# Patient Record
Sex: FEMALE | Race: WHITE | NOT HISPANIC OR LATINO
[De-identification: names, ages, dates, MRNs, and addresses within clinical notes are randomized per-mention and may not be internally consistent; named-entity substitution may affect disease eponyms.]

---

## 2018-09-24 ENCOUNTER — RESULT REVIEW (OUTPATIENT)
Age: 58
End: 2018-09-24

## 2019-01-03 ENCOUNTER — OUTPATIENT (OUTPATIENT)
Dept: OUTPATIENT SERVICES | Facility: HOSPITAL | Age: 59
LOS: 1 days | End: 2019-01-03
Payer: COMMERCIAL

## 2019-01-03 VITALS
HEIGHT: 61 IN | RESPIRATION RATE: 14 BRPM | WEIGHT: 108.91 LBS | HEART RATE: 56 BPM | OXYGEN SATURATION: 99 % | SYSTOLIC BLOOD PRESSURE: 127 MMHG | TEMPERATURE: 98 F | DIASTOLIC BLOOD PRESSURE: 75 MMHG

## 2019-01-03 DIAGNOSIS — K80.20 CALCULUS OF GALLBLADDER WITHOUT CHOLECYSTITIS WITHOUT OBSTRUCTION: ICD-10-CM

## 2019-01-03 DIAGNOSIS — K08.409 PARTIAL LOSS OF TEETH, UNSPECIFIED CAUSE, UNSPECIFIED CLASS: Chronic | ICD-10-CM

## 2019-01-03 DIAGNOSIS — Z90.89 ACQUIRED ABSENCE OF OTHER ORGANS: Chronic | ICD-10-CM

## 2019-01-03 DIAGNOSIS — K80.10 CALCULUS OF GALLBLADDER WITH CHRONIC CHOLECYSTITIS WITHOUT OBSTRUCTION: ICD-10-CM

## 2019-01-03 DIAGNOSIS — Z98.891 HISTORY OF UTERINE SCAR FROM PREVIOUS SURGERY: Chronic | ICD-10-CM

## 2019-01-03 DIAGNOSIS — Z01.818 ENCOUNTER FOR OTHER PREPROCEDURAL EXAMINATION: ICD-10-CM

## 2019-01-03 LAB
ALBUMIN SERPL ELPH-MCNC: 3.8 G/DL — SIGNIFICANT CHANGE UP (ref 3.3–5)
ALP SERPL-CCNC: 51 U/L — SIGNIFICANT CHANGE UP (ref 30–120)
ALT FLD-CCNC: 40 U/L DA — SIGNIFICANT CHANGE UP (ref 10–60)
ANION GAP SERPL CALC-SCNC: 8 MMOL/L — SIGNIFICANT CHANGE UP (ref 5–17)
AST SERPL-CCNC: 30 U/L — SIGNIFICANT CHANGE UP (ref 10–40)
BILIRUB SERPL-MCNC: 0.5 MG/DL — SIGNIFICANT CHANGE UP (ref 0.2–1.2)
BUN SERPL-MCNC: 12 MG/DL — SIGNIFICANT CHANGE UP (ref 7–23)
CALCIUM SERPL-MCNC: 9 MG/DL — SIGNIFICANT CHANGE UP (ref 8.4–10.5)
CHLORIDE SERPL-SCNC: 105 MMOL/L — SIGNIFICANT CHANGE UP (ref 96–108)
CO2 SERPL-SCNC: 30 MMOL/L — SIGNIFICANT CHANGE UP (ref 22–31)
CREAT SERPL-MCNC: 0.88 MG/DL — SIGNIFICANT CHANGE UP (ref 0.5–1.3)
GLUCOSE SERPL-MCNC: 107 MG/DL — HIGH (ref 70–99)
HCT VFR BLD CALC: 39.6 % — SIGNIFICANT CHANGE UP (ref 34.5–45)
HGB BLD-MCNC: 13.8 G/DL — SIGNIFICANT CHANGE UP (ref 11.5–15.5)
MCHC RBC-ENTMCNC: 32.1 PG — SIGNIFICANT CHANGE UP (ref 27–34)
MCHC RBC-ENTMCNC: 34.8 GM/DL — SIGNIFICANT CHANGE UP (ref 32–36)
MCV RBC AUTO: 92.1 FL — SIGNIFICANT CHANGE UP (ref 80–100)
NRBC # BLD: 0 /100 WBCS — SIGNIFICANT CHANGE UP (ref 0–0)
PLATELET # BLD AUTO: 257 K/UL — SIGNIFICANT CHANGE UP (ref 150–400)
POTASSIUM SERPL-MCNC: 3.6 MMOL/L — SIGNIFICANT CHANGE UP (ref 3.5–5.3)
POTASSIUM SERPL-SCNC: 3.6 MMOL/L — SIGNIFICANT CHANGE UP (ref 3.5–5.3)
PROT SERPL-MCNC: 6.8 G/DL — SIGNIFICANT CHANGE UP (ref 6–8.3)
RBC # BLD: 4.3 M/UL — SIGNIFICANT CHANGE UP (ref 3.8–5.2)
RBC # FLD: 13.2 % — SIGNIFICANT CHANGE UP (ref 10.3–14.5)
SODIUM SERPL-SCNC: 143 MMOL/L — SIGNIFICANT CHANGE UP (ref 135–145)
WBC # BLD: 5.73 K/UL — SIGNIFICANT CHANGE UP (ref 3.8–10.5)
WBC # FLD AUTO: 5.73 K/UL — SIGNIFICANT CHANGE UP (ref 3.8–10.5)

## 2019-01-03 PROCEDURE — 86900 BLOOD TYPING SEROLOGIC ABO: CPT

## 2019-01-03 PROCEDURE — G0463: CPT

## 2019-01-03 PROCEDURE — 36415 COLL VENOUS BLD VENIPUNCTURE: CPT

## 2019-01-03 PROCEDURE — 93005 ELECTROCARDIOGRAM TRACING: CPT

## 2019-01-03 PROCEDURE — 93010 ELECTROCARDIOGRAM REPORT: CPT

## 2019-01-03 PROCEDURE — 80053 COMPREHEN METABOLIC PANEL: CPT

## 2019-01-03 PROCEDURE — 86901 BLOOD TYPING SEROLOGIC RH(D): CPT

## 2019-01-03 PROCEDURE — 86850 RBC ANTIBODY SCREEN: CPT

## 2019-01-03 PROCEDURE — 85027 COMPLETE CBC AUTOMATED: CPT

## 2019-01-03 NOTE — H&P PST ADULT - FAMILY HISTORY
Father  Still living? No  Family history of diabetes mellitus, Age at diagnosis: Age Unknown  Family history of heart disease, Age at diagnosis: Age Unknown     Child  Still living? No  Family history of diabetes mellitus, Age at diagnosis: Age Unknown     Mother  Still living? Yes, Estimated age: 71-80  Family history of breast cancer in mother, Age at diagnosis: Age Unknown

## 2019-01-03 NOTE — H&P PST ADULT - NSANTHOSAYNRD_GEN_A_CORE
No. CHANELLE screening performed.  STOP BANG Legend: 0-2 = LOW Risk; 3-4 = INTERMEDIATE Risk; 5-8 = HIGH Risk

## 2019-01-03 NOTE — H&P PST ADULT - HISTORY OF PRESENT ILLNESS
this is a 59 y/o female who has had nausea, stomach pain, right back pain, chills on and off and 1 episode of left chest pain for about 1 month; tests show large gallstone; to have gallbladder removed

## 2019-01-04 RX ORDER — CHLORHEXIDINE GLUCONATE 213 G/1000ML
1 SOLUTION TOPICAL ONCE
Qty: 0 | Refills: 0 | Status: COMPLETED | OUTPATIENT
Start: 2019-01-10 | End: 2019-01-10

## 2019-01-04 NOTE — ASU DISCHARGE PLAN (ADULT/PEDIATRIC). - MEDICATION SUMMARY - MEDICATIONS TO TAKE
I will START or STAY ON the medications listed below when I get home from the hospital:    oxycodone-acetaminophen 5 mg-325 mg oral tablet  -- 1 tab(s) by mouth every 6 hours, As Needed -for moderate pain MDD:4   -- Caution federal law prohibits the transfer of this drug to any person other  than the person for whom it was prescribed.  May cause drowsiness.  Alcohol may intensify this effect.  Use care when operating dangerous machinery.  This prescription cannot be refilled.  This product contains acetaminophen.  Do not use  with any other product containing acetaminophen to prevent possible liver damage.  Using more of this medication than prescribed may cause serious breathing problems.    -- Indication: For pain

## 2019-01-09 ENCOUNTER — TRANSCRIPTION ENCOUNTER (OUTPATIENT)
Age: 59
End: 2019-01-09

## 2019-01-10 ENCOUNTER — OUTPATIENT (OUTPATIENT)
Dept: OUTPATIENT SERVICES | Facility: HOSPITAL | Age: 59
LOS: 1 days | End: 2019-01-10
Payer: COMMERCIAL

## 2019-01-10 ENCOUNTER — RESULT REVIEW (OUTPATIENT)
Age: 59
End: 2019-01-10

## 2019-01-10 VITALS
OXYGEN SATURATION: 99 % | SYSTOLIC BLOOD PRESSURE: 121 MMHG | DIASTOLIC BLOOD PRESSURE: 65 MMHG | HEART RATE: 71 BPM | TEMPERATURE: 98 F | RESPIRATION RATE: 18 BRPM

## 2019-01-10 VITALS
HEIGHT: 61 IN | WEIGHT: 108.25 LBS | OXYGEN SATURATION: 100 % | SYSTOLIC BLOOD PRESSURE: 114 MMHG | DIASTOLIC BLOOD PRESSURE: 64 MMHG | RESPIRATION RATE: 10 BRPM | HEART RATE: 52 BPM | TEMPERATURE: 98 F

## 2019-01-10 DIAGNOSIS — K80.10 CALCULUS OF GALLBLADDER WITH CHRONIC CHOLECYSTITIS WITHOUT OBSTRUCTION: ICD-10-CM

## 2019-01-10 DIAGNOSIS — Z98.891 HISTORY OF UTERINE SCAR FROM PREVIOUS SURGERY: Chronic | ICD-10-CM

## 2019-01-10 DIAGNOSIS — Z90.89 ACQUIRED ABSENCE OF OTHER ORGANS: Chronic | ICD-10-CM

## 2019-01-10 DIAGNOSIS — K08.409 PARTIAL LOSS OF TEETH, UNSPECIFIED CAUSE, UNSPECIFIED CLASS: Chronic | ICD-10-CM

## 2019-01-10 PROCEDURE — 47562 LAPAROSCOPIC CHOLECYSTECTOMY: CPT

## 2019-01-10 PROCEDURE — 49585: CPT | Mod: AS

## 2019-01-10 PROCEDURE — 47562 LAPAROSCOPIC CHOLECYSTECTOMY: CPT | Mod: AS

## 2019-01-10 PROCEDURE — 88304 TISSUE EXAM BY PATHOLOGIST: CPT | Mod: 26

## 2019-01-10 RX ORDER — ONDANSETRON 8 MG/1
4 TABLET, FILM COATED ORAL ONCE
Qty: 0 | Refills: 0 | Status: DISCONTINUED | OUTPATIENT
Start: 2019-01-10 | End: 2019-01-10

## 2019-01-10 RX ORDER — HYDROMORPHONE HYDROCHLORIDE 2 MG/ML
0.5 INJECTION INTRAMUSCULAR; INTRAVENOUS; SUBCUTANEOUS
Qty: 0 | Refills: 0 | Status: DISCONTINUED | OUTPATIENT
Start: 2019-01-10 | End: 2019-01-10

## 2019-01-10 RX ORDER — OXYCODONE HYDROCHLORIDE 5 MG/1
5 TABLET ORAL ONCE
Qty: 0 | Refills: 0 | Status: DISCONTINUED | OUTPATIENT
Start: 2019-01-10 | End: 2019-01-10

## 2019-01-10 RX ORDER — SODIUM CHLORIDE 9 MG/ML
1000 INJECTION, SOLUTION INTRAVENOUS
Qty: 0 | Refills: 0 | Status: DISCONTINUED | OUTPATIENT
Start: 2019-01-10 | End: 2019-01-10

## 2019-01-10 RX ADMIN — OXYCODONE HYDROCHLORIDE 5 MILLIGRAM(S): 5 TABLET ORAL at 10:25

## 2019-01-10 RX ADMIN — SODIUM CHLORIDE 100 MILLILITER(S): 9 INJECTION, SOLUTION INTRAVENOUS at 10:49

## 2019-01-10 RX ADMIN — CHLORHEXIDINE GLUCONATE 1 APPLICATION(S): 213 SOLUTION TOPICAL at 07:39

## 2019-01-10 NOTE — BRIEF OPERATIVE NOTE - PROCEDURE
<<-----Click on this checkbox to enter Procedure Cholecystectomy, laparoscopic  01/10/2019    Active  LWERMELING

## 2021-02-09 PROBLEM — Z00.00 ENCOUNTER FOR PREVENTIVE HEALTH EXAMINATION: Status: ACTIVE | Noted: 2021-02-09

## 2021-02-16 LAB — CYTOLOGY CVX/VAG DOC THIN PREP: NEGATIVE

## 2021-02-17 ENCOUNTER — APPOINTMENT (OUTPATIENT)
Dept: OBGYN | Facility: CLINIC | Age: 61
End: 2021-02-17
Payer: COMMERCIAL

## 2021-02-17 VITALS
WEIGHT: 116 LBS | SYSTOLIC BLOOD PRESSURE: 110 MMHG | BODY MASS INDEX: 21.9 KG/M2 | DIASTOLIC BLOOD PRESSURE: 70 MMHG | HEIGHT: 61 IN

## 2021-02-17 DIAGNOSIS — Z80.3 FAMILY HISTORY OF MALIGNANT NEOPLASM OF BREAST: ICD-10-CM

## 2021-02-17 DIAGNOSIS — D21.9 BENIGN NEOPLASM OF CONNECTIVE AND OTHER SOFT TISSUE, UNSPECIFIED: ICD-10-CM

## 2021-02-17 LAB
BILIRUB UR QL STRIP: NORMAL
CLARITY UR: CLEAR
COLLECTION METHOD: NORMAL
GLUCOSE UR-MCNC: NORMAL
HCG UR QL: 0.2 EU/DL
HGB UR QL STRIP.AUTO: NORMAL
KETONES UR-MCNC: NORMAL
LEUKOCYTE ESTERASE UR QL STRIP: NORMAL
NITRITE UR QL STRIP: NORMAL
PH UR STRIP: 7.5
PROT UR STRIP-MCNC: NORMAL
SP GR UR STRIP: 1.01

## 2021-02-17 PROCEDURE — 99396 PREV VISIT EST AGE 40-64: CPT | Mod: 25

## 2021-02-17 PROCEDURE — 81003 URINALYSIS AUTO W/O SCOPE: CPT | Mod: QW

## 2021-02-17 PROCEDURE — 99072 ADDL SUPL MATRL&STAF TM PHE: CPT

## 2021-02-17 RX ORDER — ZOLPIDEM TARTRATE 10 MG/1
10 TABLET, FILM COATED ORAL
Refills: 0 | Status: ACTIVE | COMMUNITY

## 2021-02-17 RX ORDER — ESTRADIOL 10 UG/1
10 TABLET, FILM COATED VAGINAL
Qty: 24 | Refills: 3 | Status: ACTIVE | COMMUNITY
Start: 2021-02-17 | End: 1900-01-01

## 2021-02-17 RX ORDER — ESTRADIOL 0.1 MG/G
0.1 CREAM VAGINAL
Qty: 1 | Refills: 2 | Status: ACTIVE | COMMUNITY
Start: 2021-02-17 | End: 1900-01-01

## 2021-02-18 LAB — HPV HIGH+LOW RISK DNA PNL CVX: NOT DETECTED

## 2021-02-21 LAB — CYTOLOGY CVX/VAG DOC THIN PREP: NORMAL

## 2021-03-31 ENCOUNTER — APPOINTMENT (OUTPATIENT)
Dept: OBGYN | Facility: CLINIC | Age: 61
End: 2021-03-31
Payer: COMMERCIAL

## 2021-03-31 ENCOUNTER — ASOB RESULT (OUTPATIENT)
Age: 61
End: 2021-03-31

## 2021-03-31 PROCEDURE — 77080 DXA BONE DENSITY AXIAL: CPT

## 2021-03-31 PROCEDURE — 76830 TRANSVAGINAL US NON-OB: CPT

## 2021-03-31 PROCEDURE — 99072 ADDL SUPL MATRL&STAF TM PHE: CPT

## 2022-04-27 ENCOUNTER — APPOINTMENT (OUTPATIENT)
Dept: OBGYN | Facility: CLINIC | Age: 62
End: 2022-04-27
Payer: COMMERCIAL

## 2022-04-27 VITALS
WEIGHT: 118 LBS | DIASTOLIC BLOOD PRESSURE: 72 MMHG | SYSTOLIC BLOOD PRESSURE: 105 MMHG | BODY MASS INDEX: 22.28 KG/M2 | HEIGHT: 61 IN

## 2022-04-27 DIAGNOSIS — Z01.419 ENCOUNTER FOR GYNECOLOGICAL EXAMINATION (GENERAL) (ROUTINE) W/OUT ABNORMAL FINDINGS: ICD-10-CM

## 2022-04-27 DIAGNOSIS — N95.1 MENOPAUSAL AND FEMALE CLIMACTERIC STATES: ICD-10-CM

## 2022-04-27 DIAGNOSIS — Z80.3 FAMILY HISTORY OF MALIGNANT NEOPLASM OF BREAST: ICD-10-CM

## 2022-04-27 DIAGNOSIS — Z78.9 OTHER SPECIFIED HEALTH STATUS: ICD-10-CM

## 2022-04-27 PROCEDURE — 82270 OCCULT BLOOD FECES: CPT

## 2022-04-27 PROCEDURE — 99396 PREV VISIT EST AGE 40-64: CPT

## 2022-04-28 LAB — HPV HIGH+LOW RISK DNA PNL CVX: NOT DETECTED

## 2022-05-02 LAB — CYTOLOGY CVX/VAG DOC THIN PREP: NORMAL

## 2022-05-20 ENCOUNTER — APPOINTMENT (OUTPATIENT)
Dept: OBGYN | Facility: CLINIC | Age: 62
End: 2022-05-20
Payer: COMMERCIAL

## 2022-05-20 ENCOUNTER — ASOB RESULT (OUTPATIENT)
Age: 62
End: 2022-05-20

## 2022-05-20 PROCEDURE — 76830 TRANSVAGINAL US NON-OB: CPT

## 2022-09-26 ENCOUNTER — RX RENEWAL (OUTPATIENT)
Age: 62
End: 2022-09-26

## 2022-09-26 RX ORDER — ESTRADIOL 10 UG/1
10 TABLET, FILM COATED VAGINAL
Qty: 24 | Refills: 3 | Status: ACTIVE | COMMUNITY
Start: 2022-04-27 | End: 1900-01-01

## 2022-10-09 ENCOUNTER — EMERGENCY (EMERGENCY)
Facility: HOSPITAL | Age: 62
LOS: 1 days | Discharge: HOME | End: 2022-10-09
Attending: EMERGENCY MEDICINE | Admitting: EMERGENCY MEDICINE
Payer: COMMERCIAL

## 2022-10-09 VITALS
DIASTOLIC BLOOD PRESSURE: 72 MMHG | OXYGEN SATURATION: 98 % | HEART RATE: 64 BPM | SYSTOLIC BLOOD PRESSURE: 113 MMHG | RESPIRATION RATE: 16 BRPM | TEMPERATURE: 98 F | WEIGHT: 113.98 LBS | HEIGHT: 65 IN

## 2022-10-09 VITALS
OXYGEN SATURATION: 98 % | RESPIRATION RATE: 16 BRPM | HEART RATE: 62 BPM | DIASTOLIC BLOOD PRESSURE: 70 MMHG | SYSTOLIC BLOOD PRESSURE: 115 MMHG

## 2022-10-09 DIAGNOSIS — K08.409 PARTIAL LOSS OF TEETH, UNSPECIFIED CAUSE, UNSPECIFIED CLASS: Chronic | ICD-10-CM

## 2022-10-09 DIAGNOSIS — Z98.891 HISTORY OF UTERINE SCAR FROM PREVIOUS SURGERY: Chronic | ICD-10-CM

## 2022-10-09 DIAGNOSIS — Z90.89 ACQUIRED ABSENCE OF OTHER ORGANS: Chronic | ICD-10-CM

## 2022-10-09 PROCEDURE — 72125 CT NECK SPINE W/O DYE: CPT | Mod: MA

## 2022-10-09 PROCEDURE — 70450 CT HEAD/BRAIN W/O DYE: CPT | Mod: MA

## 2022-10-09 PROCEDURE — 99284 EMERGENCY DEPT VISIT MOD MDM: CPT | Mod: 25

## 2022-10-09 PROCEDURE — 23650 CLTX SHO DSLC W/MNPJ WO ANES: CPT | Mod: 54

## 2022-10-09 PROCEDURE — 99285 EMERGENCY DEPT VISIT HI MDM: CPT | Mod: 57

## 2022-10-09 PROCEDURE — 23650 CLTX SHO DSLC W/MNPJ WO ANES: CPT | Mod: RT

## 2022-10-09 PROCEDURE — 72125 CT NECK SPINE W/O DYE: CPT | Mod: 26,MA

## 2022-10-09 PROCEDURE — 70450 CT HEAD/BRAIN W/O DYE: CPT | Mod: 26,MA

## 2022-10-09 PROCEDURE — 73030 X-RAY EXAM OF SHOULDER: CPT | Mod: 26,RT

## 2022-10-09 PROCEDURE — 73030 X-RAY EXAM OF SHOULDER: CPT

## 2022-10-09 RX ORDER — OXYCODONE AND ACETAMINOPHEN 5; 325 MG/1; MG/1
1 TABLET ORAL ONCE
Refills: 0 | Status: DISCONTINUED | OUTPATIENT
Start: 2022-10-09 | End: 2022-10-09

## 2022-10-09 RX ORDER — ONDANSETRON 8 MG/1
4 TABLET, FILM COATED ORAL ONCE
Refills: 0 | Status: COMPLETED | OUTPATIENT
Start: 2022-10-09 | End: 2022-10-09

## 2022-10-09 RX ADMIN — ONDANSETRON 4 MILLIGRAM(S): 8 TABLET, FILM COATED ORAL at 18:54

## 2022-10-09 RX ADMIN — OXYCODONE AND ACETAMINOPHEN 1 TABLET(S): 5; 325 TABLET ORAL at 20:16

## 2022-10-09 RX ADMIN — Medication 300 MILLIGRAM(S): at 20:16

## 2022-10-09 RX ADMIN — OXYCODONE AND ACETAMINOPHEN 1 TABLET(S): 5; 325 TABLET ORAL at 18:54

## 2022-10-09 NOTE — ED PROVIDER NOTE - NSFOLLOWUPINSTRUCTIONS_ED_ALL_ED_FT
1) Follow-up with Orthopedics, See referred doctor. Call today/next business day for close, prompt follow-up.  2) Return to Emergency room for any worsening or persistent pain, weakness, numbness, fever, color change to extremity, or any other concerning symptoms.  3) Take ibuprofen 600 mg every  6 hours as needed.   4) You can consider discussing with your doctor if physical therapy or further imaging as an MRI may be beneficial.     Antibiotics were sent to your pharmacy.         Shoulder Dislocation       Your shoulder joint is made up of 3 bones:  •The upper arm bone (humerus).      •The shoulder blade (scapula).      •The collarbone (clavicle).      A shoulder dislocation happens when your upper arm bone moves out of its normal place in your shoulder joint.      What are the causes?    This condition is often caused by:  •A fall.      •A hard, direct hit to the shoulder.      •A forceful movement of the shoulder.        What increases the risk?    You are more likely to develop this condition if you play sports.      What are the signs or symptoms?     •Bad shape (deformity) of the shoulder.      •Very bad pain.      •A shoulder that you cannot move.      •Numbness, weakness, or tingling in your neck or down your arm.      •Bruising or swelling around your shoulder.        How is this treated?    This condition is treated with a procedure called a reduction. This is done to place the upper arm bone back in the joint. There are two types of reduction:  •Closed reduction. The upper arm bone is placed back in the joint without surgery. The doctor uses his or her hands to guide the bone back into place.    •Open reduction. Surgery is done to place the upper arm bone back in the joint. This may be needed if:  •You have a weak shoulder joint or weak tissues that connect bones to each other (ligaments).      •You have had more than one shoulder dislocation.      •The nerves or blood vessels around your shoulder have been damaged.        After the procedure, you will wear a brace or sling to prevent the arm from moving.    After the brace or sling is removed, you will have physical therapy to help improve movement (range of motion) in your shoulder joint.      Follow these instructions at home:    Medicines     •Take over-the-counter and prescription medicines only as told by your doctor.    •Ask your doctor if the medicine prescribed to you:   •Requires you to avoid driving or using heavy machinery.     •Can cause trouble pooping (constipation). You may need to take steps to prevent or treat trouble pooping:  •Drink enough fluid to keep your pee (urine) pale yellow.      •Take over-the-counter or prescription medicines.      •Eat foods that are high in fiber. These include beans, whole grains, and fresh fruits and vegetables.       •Limit foods that are high in fat and sugar. These include fried or sweet foods.          If you have a brace or sling:     •Wear the brace or sling as told by your doctor. Remove it only as told by your doctor.    •Loosen the brace or sling if your fingers:  •Tingle.      •Become numb.      •Turn cold or blue.        •Keep the brace or sling clean.    •If the brace or sling is not waterproof:  •Do not let it get wet.      •Cover it with a watertight covering when you take a bath or shower.          Managing pain, stiffness, and swelling    •If told, put ice on the injured area.  •If you can remove your brace or sling, remove it as told by your doctor.      •Put ice in a plastic bag.      •Place a towel between your skin and the bag.      •Leave the ice on for 20 minutes, 2–3 times per day.        •Move your fingers often.      •Raise (elevate) the injured area above the level of your heart while you are sitting or lying down.      Activity     • Do not lift your arm above shoulder level until your doctor approves.      • Do not lift anything until your doctor says that it is safe.      • Do not push or pull things until your doctor approves.      •Return to your normal activities as told by your doctor. Ask your doctor what activities are safe for you.      •Do range-of-motion exercises only as told by your doctor.      •Exercise your hand by squeezing a soft ball. This keeps your hand and wrist from getting stiff and swollen.      General instructions     • Do not drive while you are wearing a brace or sling on a hand that you use for driving.      • Do not take baths, swim, or use a hot tub until your doctor approves. Ask your doctor if you may take showers. You may only be allowed to take sponge baths.      • Do not use any tobacco products, including cigarettes, chewing tobacco, or e-cigarettes. These can delay healing. If you need help quitting, ask your doctor.      •Keep all follow-up visits as told by your doctor. This is important.        Contact a doctor if:    •Your brace or sling gets damaged.        Get help right away if:    •Your pain gets worse, not better.      •You lose feeling in your arm or hand.      •Your arm or hand turns white and cold.        Summary    •A shoulder dislocation happens when your upper arm bone moves out of its normal place in your shoulder joint.      •It is often caused by a fall, a strong hit to the shoulder, or a forceful movement of the shoulder.      •It causes very bad pain. You may not be able to move your shoulder.      •This condition is treated with either closed or open reduction. You will also be given a brace or sling. You will do exercises to improve movement in your shoulder joint.      •Contact a doctor if your brace or sling gets damaged. Get help right away if your pain gets worse, you lose feeling in your arm or hand, or your arm or hand turns white or cold.      This information is not intended to replace advice given to you by your health care provider. Make sure you discuss any questions you have with your health care provider.

## 2022-10-09 NOTE — ED PROVIDER NOTE - NSICDXFAMILYHX_GEN_ALL_CORE_FT
FAMILY HISTORY:  Father  Still living? No  Family history of diabetes mellitus, Age at diagnosis: Age Unknown  Family history of heart disease, Age at diagnosis: Age Unknown    Mother  Still living? Yes, Estimated age: 71-80  Family history of breast cancer in mother, Age at diagnosis: Age Unknown    Child  Still living? No  Family history of diabetes mellitus, Age at diagnosis: Age Unknown

## 2022-10-09 NOTE — ED PROVIDER NOTE - PROGRESS NOTE DETAILS
Shoulder reduced by Dr. Virk. Intraarticular block done. scapular manipulation. pt reports she has ortho/dental/OMFS followup. Reevaluated patient at bedside. Patient notes improvement of symptoms. Discussed results with the patient and provided copies.  All questions were answered. Discussed the importance of prompt, close medical follow-up. Patient will return with any changes, concerns or persistent/worsening symptoms.  Patient verbalized understanding. Shoulder reduced by Dr. Virk. Intraarticular block done. scapular manipulation. pt reports she has ortho/dental/OMFS followup. sling provided

## 2022-10-09 NOTE — ED PROVIDER NOTE - OBJECTIVE STATEMENT
63 y/o female without reported PMHx presents today due to trip and fall 2.5 hours ago. pt tripped over her dogs leash. reports falling forward with mouth injury and right shoulder injury. pt describes right shoulder pain as aching, non-radiating, and currently 10/10. pt reports she noted dental fracture and will follow up with dentist. Reports her  is a plastic surgeon and checked her inner upper lip, no lacerations. pt denies headache, vomiting, numbness/weakness, cp, sob, prodromal symptoms, blood thinner use, neck pain, loc, visual changes, slurred speech, or any other complaints.

## 2022-10-09 NOTE — ED ADULT TRIAGE NOTE - CHIEF COMPLAINT QUOTE
" I tripped on the leash and fell while walking my dogs, my right shoulder hurts a lot, my face/ mouth hit the ground, broke my teeth "

## 2022-10-09 NOTE — ED PROVIDER NOTE - PHYSICAL EXAMINATION
Constitutional: Awake, Alert, non-toxic. NAD. Well appearing, well nourished.   HEAD: Normocephalic, atraumatic.   EYES: PERRL, EOM intact, conjunctiva and sclera are clear bilaterally. No raccoon eyes.   ENT: (+) front upper #8 & #9 dental fx, (+) upper lip swelling and abrasion (pt declined inner lip exam, reports her  is a plastic surgeon and checked), No rhinorrhea, normal pharynx, patent, no tonsillar exudate or enlargement, mucous membranes pink/moist, no erythema, no drooling or stridor.   NECK: Supple, non-tender  BACK: No midline or paraspinal TTP of cervical/thoracic/lumbar spine, FROM. No ecchymosis or hematomas. no rib TTP.   CARDIOVASCULAR: Normal S1, S2; regular rate and rhythm.  RESPIRATORY: Normal respiratory effort; breath sounds CTAB, no wheezes, rhonchi, or rales. Speaking in full sentences. No accessory muscle use.   ABDOMEN: Soft; non-tender, non-distended  EXTREMITIES: (+) right shoulder TTP and deformity, limited ROM of right shoulder, elbow and wrist non-tender to palpation; distal pulses palpable and symmetric, no edema, no crepitus or step off  SKIN: Warm, dry; good skin turgor, no apparent lesions or rashes, no ecchymosis, brisk capillary refill.  NEURO: A&O x3. Sensory and motor functions are grossly intact. Speech is normal. Appearance and judgement seem appropriate for gender and age. No neurological deficits. Neurovascular sensation intact motor function 5/5 of upper and lower extremities, CN II-XII grossly intact, no ataxia, absent pronator drift, intact cerebellar function. Speech clear, without articulation or word-finding difficulties. Eyes- PERRL bilaterally. EOMs in tact. No nystagmus. No facial droop.

## 2022-10-09 NOTE — ED PROVIDER NOTE - PATIENT PORTAL LINK FT
You can access the FollowMyHealth Patient Portal offered by [facility] by registering at the following website: [email or web address]. By joining [employer]’s FollowMyHealth portal, you will also be able to view your health information using other applications (apps) compatible with our system.

## 2022-10-09 NOTE — ED ADULT NURSE NOTE - OBJECTIVE STATEMENT
AAOx3 c/o walking 2 dogs today and tripping over tirado. Denies LOC, denies NVD, HA or NVD. Denies blood thinner. Patient holding right arm s/p pain. Patient ambulatory. Bruising and swelling noted around lips. Tooth broken during fall.

## 2022-10-09 NOTE — ED PROVIDER NOTE - CARE PROVIDER_API CALL
Hans Navas)  Orthopedics  [address], [address]  [place] [address]  Phone: [phone]  Fax: [phone]  Follow Up Time: 1-3 Days

## 2022-10-09 NOTE — ED PROVIDER NOTE - CARE PLAN
Principal Discharge DX:	Fall  Secondary Diagnosis:	Dislocation of left shoulder joint  Secondary Diagnosis:	Facial injury  Secondary Diagnosis:	Broken tooth   1

## 2022-10-09 NOTE — ED PROVIDER NOTE - CLINICAL SUMMARY MEDICAL DECISION MAKING FREE TEXT BOX
Patient is a 62-year-old female with no significant medical or surgical history.  She was out walking her dogs but then pulled her and she tripped and fell.  She fell forward striking her face on the ground, injuring her right shoulder.  She broke the 2 front upper central incisors.  Creatinine midway through.  Sustaining abrasions to her nose and her left knee.  She also has deformity to her right shoulder which is causing enormous amount of pain.  She denies loss of consciousness.  She denies any neck or back pain.  She denies any numbness in her right hand or wrist.  On evaluation: HEENT patient has the abrasions to her nose.  Without any epistaxis.  She has a upper dental trauma.  As noted, no grunting flaring or retractions.  Patient refused to have a intraoral examination because her  (who is a plastic surgeon), examined her and did not find a laceration.  Cardiac exam is normal.  Chest exam is normal.  Abdominal exam is normal.  Musculoskeletal exam patient has a deformity to the right shoulder with loss of normal curvature consistent with a dislocation of the right shoulder.  There is mild discomfort to the anterior right chest wall consistent with anteroinferior dislocation.  There is no wrist drop of the right wrist.  There is no pain in the elbow or wrist.  She has no pain in the hips or knees.  She has an abrasion to her left knee.  Neuro exam is normal.  Plan analgesia.  Sling to rest, ice the shoulder.  X-rays to rule out fracture.  pt states after extensive discussin that her mom hit head and later developed ich is very concerned and is hoping for ct head as well.

## 2022-10-10 ENCOUNTER — RX RENEWAL (OUTPATIENT)
Age: 62
End: 2022-10-10

## 2022-10-10 RX ORDER — ESTRADIOL 0.1 MG/G
0.1 CREAM VAGINAL
Qty: 42.5 | Refills: 3 | Status: ACTIVE | COMMUNITY
Start: 2022-04-27 | End: 1900-01-01

## 2022-10-14 ENCOUNTER — APPOINTMENT (OUTPATIENT)
Dept: ORTHOPEDIC SURGERY | Facility: CLINIC | Age: 62
End: 2022-10-14

## 2022-10-14 VITALS — HEIGHT: 60 IN | WEIGHT: 115 LBS | BODY MASS INDEX: 22.58 KG/M2

## 2022-10-14 DIAGNOSIS — S43.014A ANTERIOR DISLOCATION OF RIGHT HUMERUS, INITIAL ENCOUNTER: ICD-10-CM

## 2022-10-14 PROCEDURE — 99204 OFFICE O/P NEW MOD 45 MIN: CPT

## 2022-10-14 PROCEDURE — 73030 X-RAY EXAM OF SHOULDER: CPT | Mod: RT

## 2022-10-14 PROCEDURE — A4565: CPT

## 2022-10-14 NOTE — PHYSICAL EXAM
[Left] : left shoulder [Mild] : mild [] : no ecchymosis [Sitting] : sitting [Right] : right shoulder [de-identified] : The deltoid fires. [FreeTextEntry9] : IR: T8 [FreeTextEntry1] : The shoulder is located, No acute fracture.  [TWNoteComboBox4] : passive forward flexion 170 degrees [de-identified] : external rotation 70 degrees

## 2022-10-14 NOTE — ASSESSMENT
[FreeTextEntry1] : We discussed the underlying pathology. \par Treatment options reviewed. \par Provided sling in office. \par Plan for MRI of the right shoulder. \par Briefly discussed what surgery entails. \par Advised MRI is needed to properly advise treatment options.\par Cautions discussed. \par Questions answered.\par

## 2022-10-14 NOTE — IMAGING
[Left] : left shoulder [FreeTextEntry1] : Outside xrays from 10/9/22 reveals locked anterior dislocation, and successful reduction of GH joint.

## 2022-10-14 NOTE — HISTORY OF PRESENT ILLNESS
[Sudden] : sudden [10] : 10 [0] : 0 [Dull/Aching] : dull/aching [Rest] : rest [Meds] : meds [] : no [FreeTextEntry1] : right shoulder  [FreeTextEntry5] : pt was walking his dog and she fell over the leash and dislocated her right shoulder. pt has been in a sling since then  [FreeTextEntry9] : advil [de-identified] : movement  [de-identified] : 10/09/2022 [de-identified] : [facility]

## 2022-10-14 NOTE — REASON FOR VISIT
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  At [facility], an anterior dislocation was reduced.  No prior hx.  Pain is decreasing slightly. Denies n/t. Mostly wearing sling.

## 2022-10-14 NOTE — DISCUSSION/SUMMARY
[de-identified] : The documentation recorded by the scribe accurately reflects the service I personally performed and the decisions made by me.\par Entered by Jacob Morgan acting as scribe.\par \par Patient seen by Humberto Sharif M.D.\par Dr. Humberto Sharif\par Shoulder Surgery\par

## 2022-10-17 ENCOUNTER — RESULT REVIEW (OUTPATIENT)
Age: 62
End: 2022-10-17

## 2022-10-17 ENCOUNTER — APPOINTMENT (OUTPATIENT)
Dept: ORTHOPEDIC SURGERY | Facility: CLINIC | Age: 62
End: 2022-10-17

## 2022-10-19 ENCOUNTER — APPOINTMENT (OUTPATIENT)
Dept: ORTHOPEDIC SURGERY | Facility: CLINIC | Age: 62
End: 2022-10-19

## 2023-01-03 NOTE — H&P PST ADULT - NSANTHSNORERD_ENT_A_CORE
Check blood pressure at home at least a few times a week.   Continue good hydration, low sodium diet.   Avoid ibuprofen/aleve.   Labs in the next 1-2 weeks.   Follow up with Dr. Diaz as planned.   Call sooner with any new questions or concerns    Will fax orders to [facility].   
No

## 2023-02-17 NOTE — ASU PATIENT PROFILE, ADULT - REASON FOR ADMISSION, PROFILE
LCV:10/13/2022  [facility]  Derm process 1   "having my gallbladder taken out "

## 2023-07-27 ENCOUNTER — APPOINTMENT (OUTPATIENT)
Dept: OBGYN | Facility: CLINIC | Age: 63
End: 2023-07-27
Payer: COMMERCIAL

## 2023-07-27 VITALS
BODY MASS INDEX: 23.16 KG/M2 | HEIGHT: 60 IN | WEIGHT: 118 LBS | DIASTOLIC BLOOD PRESSURE: 71 MMHG | SYSTOLIC BLOOD PRESSURE: 141 MMHG

## 2023-07-27 PROCEDURE — 77080 DXA BONE DENSITY AXIAL: CPT

## 2023-07-27 PROCEDURE — 99396 PREV VISIT EST AGE 40-64: CPT

## 2023-07-27 RX ORDER — ESTRADIOL 10 UG/1
10 TABLET, FILM COATED VAGINAL
Qty: 24 | Refills: 3 | Status: ACTIVE | COMMUNITY
Start: 2023-07-27 | End: 1900-01-01

## 2023-07-27 RX ORDER — ESTRADIOL 0.1 MG/G
0.1 CREAM VAGINAL
Qty: 1 | Refills: 3 | Status: ACTIVE | COMMUNITY
Start: 2023-07-27 | End: 1900-01-01

## 2023-07-27 NOTE — PLAN
[FreeTextEntry1] : #HCM\par -Breast self exam reviewed and taught\par -STI Screening declined\par -Pap/HPV today\par -DEXA bone density due 2025\par -Colonoscopy scheduled for 02/24\par -To schedule TVUS, nonurgent\par -Immunizations: UTD\par \par #Atrophy:\par -Rx reordered for Vagifem and Estrace\par -Samples for Hyalogyn given\par \par RTO in 1 year for annual GYN exam or PRN for any GYN complaints\par DELMIS Sanchez MD\par

## 2023-07-27 NOTE — HISTORY OF PRESENT ILLNESS
[Patient reported mammogram was normal] : Patient reported mammogram was normal [Patient reported breast sonogram was normal] : Patient reported breast sonogram was normal [Patient reported PAP Smear was normal] : Patient reported PAP Smear was normal [Patient reported bone density results were normal] : Patient reported bone density results were normal [Patient reported colonoscopy was normal] : Patient reported colonoscopy was normal [FreeTextEntry1] : CECIL DUBON 62 year old, , presents for an annual gyn exam. No vaginal discharge or vaginitis symptoms. She denies abdominal and pelvic pain. No urinary complaints. BM is normal per patient.\par \par OB Hx: \par MedHx: GSM, Anxiety, osteopenia\par Meds: Ambien 10 mg PRN, Vagifem, Estrace externally 2x weekly, Valium 5 mm PRN \par All: Ampicillin\par ObHx: C/S x 2, D&C x 1, FT  x 1, SAB x 1 \par GynHx: known small Ovarian cyst and fibroid. No Hx of STI, abnl paps, or pelvic infections.\par SurgHx: Lap cholecystectomy in 2018, C/S x 2, D&C x 1, rotator cuff surgery\par FamHx: Mother at age 50 and maternal aunt in 60s: breast ca. Denies FHx of ovarian, uterine or colon cancer.\par \par Health maintenance:\par Immunizations: UTD\par  [TextBox_4] : 05/22 TVUS- thin endo, trace fluid, anterior fibroid 1.4cm- measuring smaller [Mammogramdate] : 06/23 [TextBox_19] : normal per pt, to send records [BreastSonogramDate] : 06/23 [PapSmeardate] : 04/22 [TextBox_31] : NILM, HRHPV- [BoneDensityDate] : 07/23 [TextBox_37] : osteopenia [ColonoscopyDate] : 2020 [TextBox_43] : next in 02/24

## 2023-07-27 NOTE — PHYSICAL EXAM
[Chaperone Present] : A chaperone was present in the examining room during all aspects of the physical examination [Appropriately responsive] : appropriately responsive [Alert] : alert [No Acute Distress] : no acute distress [No Lymphadenopathy] : no lymphadenopathy [Regular Rate Rhythm] : regular rate rhythm [No Murmurs] : no murmurs [Clear to Auscultation B/L] : clear to auscultation bilaterally [Soft] : soft [Non-tender] : non-tender [Non-distended] : non-distended [No HSM] : No HSM [No Mass] : no mass [No Lesions] : no lesions [Oriented x3] : oriented x3 [Examination Of The Breasts] : a normal appearance [No Masses] : no breast masses were palpable [Labia Minora] : normal [Labia Majora] : normal [Normal] : normal [Uterine Adnexae] : normal [FreeTextEntry1] : radha [Declined] : Patient declined rectal exam

## 2023-07-31 LAB
CYTOLOGY CVX/VAG DOC THIN PREP: NORMAL
HPV HIGH+LOW RISK DNA PNL CVX: NOT DETECTED

## 2025-02-26 NOTE — H&P PST ADULT - PSYCHIATRIC
----- Message from RAFAEL Montoya sent at 2/26/2025  9:20 AM CST -----  Please let Maria Alejandra know her EMG studies are normal- do not show carpal tunnel, or other neuropathic conditions. If she continues to have the numbness and tingling sensations we would refer her to neurology.    negative

## 2025-04-01 NOTE — ASU PREOP CHECKLIST - ALLERGY BAND ON
Pt stopped taking hydrochlorothiazide and dizziness has stopped  Pt wants to know what she should be taking now for cholesterol?  [facility]      done